# Patient Record
Sex: FEMALE | Race: WHITE
[De-identification: names, ages, dates, MRNs, and addresses within clinical notes are randomized per-mention and may not be internally consistent; named-entity substitution may affect disease eponyms.]

---

## 2019-06-28 ENCOUNTER — HOSPITAL ENCOUNTER (OUTPATIENT)
Dept: HOSPITAL 61 - KCIC MAMMO | Age: 44
Discharge: HOME | End: 2019-06-28
Attending: FAMILY MEDICINE
Payer: COMMERCIAL

## 2019-06-28 DIAGNOSIS — N63.11: ICD-10-CM

## 2019-06-28 DIAGNOSIS — Z12.31: Primary | ICD-10-CM

## 2019-06-28 PROCEDURE — 77067 SCR MAMMO BI INCL CAD: CPT

## 2019-06-28 NOTE — [facility]
Bilateral digital screening mammograms:

 

Reason for examination: Routine screening. New baseline.

 

Interpretation was made with the benefit of CAD.

 

The skin and nipples show no abnormalities. No abnormal axillary lymph 

nodes are seen. The breast parenchyma is heterogeneously dense. (Breast 

density: Category C.) There is a circumscribed lesion in the 9:30 position

9 cm posterior to the nipple in the right breast which appears to measure 

approximately 1.5 cm in greatest dimension. There is also a nodule present

within the dense parenchyma more anteriorly at the 9:00 position 4 cm from

the nipple measuring 1.2 cm in greatest dimension further evaluation with 

ultrasound is recommended. There are no other new dominant masses, 

suspicious calcifications or architectural distortion.

 

Impression:

 

Nodular densities at the 9:30 position 9 cm from the nipple and at the 

9:00 position 4 cm from the nipple in the right breast. These measure 

approximately 1.5 cm and 1.2 cm in greatest dimensions respectively. 

Recommend further evaluation with ultrasound.

 

Your patient's mammogram demonstrates that she has dense breast tissue 

(breast density category C or D), which could hide abnormalities, and if 

she has other risk factors for breast cancer that have been identified, 

she might benefit from supplemental screening tests that may be suggested 

by you as her ordering physician. Dense breast tissue, in and of itself, 

is a relatively common condition. Therefore, this information is not 

provided to cause undue concern, but rather to raise your awareness and to

promote discussion with your patient regarding the presence of other risk 

factors, in addition to dense breast tissue. Your patient's mammography 

results will be sent to her.

 

BI-RAD Category 0: Incomplete. Needs additional imaging evaluation.

 

"Our facility is accredited by the [employer] 

Mammography Program."

 

This patient's information has been entered into a reminder system for the

patient to be notified with the results of her examination and a target 

date for the next mammogram.

 

Electronically signed by: Lulu Baron MD (6/28/2019 12:02 PM) [facility]

## 2019-07-05 ENCOUNTER — HOSPITAL ENCOUNTER (OUTPATIENT)
Dept: HOSPITAL 61 - KCIC US | Age: 44
Discharge: HOME | End: 2019-07-05
Attending: FAMILY MEDICINE
Payer: COMMERCIAL

## 2019-07-05 DIAGNOSIS — N60.01: Primary | ICD-10-CM

## 2019-07-05 DIAGNOSIS — N60.41: ICD-10-CM

## 2019-07-05 PROCEDURE — 76641 ULTRASOUND BREAST COMPLETE: CPT

## 2019-07-05 NOTE — [facility]
Right breast ultrasound:

 

Reason for examination: Nodular densities on screening mammogram.

 

Comparison is made to mammographic exam dated 6/28/2019.

 

Ultrasound examination was performed with attention to the lateral right 

breast and right axilla.

 

In the 9:30 position 7 cm from the nipple, there is a 1.5 cm simple cyst 

this corresponds to the density seen posteriorly. More anteriorly at the 

9:00 position 1 cm from the nipple, there is focal ductal ectasia 

measuring 1.5 cm in length and 3 mm in width. No other cystic or solid 

nodules are seen. No abnormal appearing lymph nodes are seen in the 

axilla.

 

IMPRESSION:

 

Simple cyst at the 9:30 position 7 cm from the nipple and ductal ectasia 

at the 9:00 position 1 cm from the nipple. No other suspicious abnormality

seen. Recommend 6 month follow-up with right breast mammograms and 

ultrasound.

 

BI-RADS Category 3:  Probably Benign.

 

"Our facility is accredited by the [employer] 

Mammography Program."

 

This patient's information has been entered into a reminder system for the

patient to be notified with the results of her examination and a target 

date for the next mammogram.

 

Electronically signed by: Lulu Baron MD (7/5/2019 10:14 AM) [facility]

## 2019-12-26 ENCOUNTER — HOSPITAL ENCOUNTER (OUTPATIENT)
Dept: HOSPITAL 61 - KCIC MAMMO | Age: 44
Discharge: HOME | End: 2019-12-26
Attending: FAMILY MEDICINE
Payer: COMMERCIAL

## 2019-12-26 DIAGNOSIS — N60.41: ICD-10-CM

## 2019-12-26 DIAGNOSIS — N60.01: Primary | ICD-10-CM

## 2019-12-26 PROCEDURE — 77065 DX MAMMO INCL CAD UNI: CPT

## 2019-12-26 PROCEDURE — 76641 ULTRASOUND BREAST COMPLETE: CPT

## 2019-12-26 NOTE — [facility]
RIGHT DIAGNOSTIC MAMMOGRAPHY AND BREAST ULTRASOUND 

 

History: Six-month follow-up

 

Comparison:  Bilateral mammogram June 28, 2019.

 

Technique: Right digital mammogram views were obtained. 

 

Findings:  

Breast Tissue Density C : The breasts are heterogeneously dense, which may

obscure small masses.

Well-circumscribed mass in the right breast 9:30 C position is stable.

There are no suspicious microcalcifications or architectural distortion.

 

Real-time ultrasound imaging of the right breast is performed.

Stable benign cyst at the 9:30 o'clock position 7 cm from the nipple 

measuring up to 1.4 cm. Ductal ectasia at the 9:00 position 1 cm from the 

nipple has decreased from prior study. There is no new finding.

 

IMPRESSION:

Stable right mammogram. Ultrasound findings in the right breast are 

benign. Recommend patient return to routine mammogram screening.

 

BI-RADS category 2:  Benign findings.

 

The images were reviewed with computer-aided detection.

 

Patient information is entered into the reminder system with a target due 

date for the next screening mammogram.

 

Mammography is the most sensitive method for finding small breast cancers,

but it does not detect them all and is not a substitute for careful 

clinical examination. A negative mammogram does not negate a clinically 

suspicious finding and should not result in delay in biopsying a 

clinically suspicious abnormality.

 

 "Our facility is accredited by the [employer] 

Mammography Program."

 

Electronically signed by: Gallito Almanza MD (12/26/2019 10:34 AM) [facility]

## 2019-12-26 NOTE — [facility]
RIGHT DIAGNOSTIC MAMMOGRAPHY AND BREAST ULTRASOUND 

 

History: Six-month follow-up

 

Comparison:  Bilateral mammogram June 28, 2019.

 

Technique: Right digital mammogram views were obtained. 

 

Findings:  

Breast Tissue Density C : The breasts are heterogeneously dense, which may

obscure small masses.

Well-circumscribed mass in the right breast 9:30 C position is stable.

There are no suspicious microcalcifications or architectural distortion.

 

Real-time ultrasound imaging of the right breast is performed.

Stable benign cyst at the 9:30 o'clock position 7 cm from the nipple 

measuring up to 1.4 cm. Ductal ectasia at the 9:00 position 1 cm from the 

nipple has decreased from prior study. There is no new finding.

 

IMPRESSION:

Stable right mammogram. Ultrasound findings in the right breast are 

benign. Recommend patient return to routine mammogram screening.

 

BI-RADS category 2:  Benign findings.

 

The images were reviewed with computer-aided detection.

 

Patient information is entered into the reminder system with a target due 

date for the next screening mammogram.

 

Mammography is the most sensitive method for finding small breast cancers,

but it does not detect them all and is not a substitute for careful 

clinical examination. A negative mammogram does not negate a clinically 

suspicious finding and should not result in delay in biopsying a 

clinically suspicious abnormality.

 

 "Our facility is accredited by the [employer] 

Mammography Program."

 

Electronically signed by: Gallito Almanza MD (12/26/2019 10:34 AM) [facility]

## 2022-05-27 ENCOUNTER — HOSPITAL ENCOUNTER (OUTPATIENT)
Dept: HOSPITAL 61 - KCIC MAMMO | Age: 47
End: 2022-05-27
Attending: FAMILY MEDICINE
Payer: COMMERCIAL

## 2022-05-27 DIAGNOSIS — Z12.31: Primary | ICD-10-CM

## 2022-05-27 PROCEDURE — 77063 BREAST TOMOSYNTHESIS BI: CPT

## 2022-05-27 PROCEDURE — 77067 SCR MAMMO BI INCL CAD: CPT

## 2022-05-27 NOTE — [facility]
Bilateral digital screening mammograms with 3-D tomosynthesis:



Reason for examination: Routine screening.



Comparison is made to previous study dated 6/28/2019 and 12/26/2019.



Bilateral mammograms in CC and oblique projections were obtained with 2-D imaging and 3-D tomosynthes
is imaging on a Siemens Inspiration unit and reviewed on the workstation. Interpretation was made tomas hidalgo the benefit of CAD.



The skin and nipples show no abnormalities. No abnormal axillary lymph nodes are seen. The breast par
enchyma is heterogeneously dense. (Breast density: Category C.) There continues to be a circumscribed
 nodule at the 9:30 position of the right breast which is stable. There are no new dominant masses, s
uspicious calcifications or architectural distortion.



Impression:



No evidence of malignancy. Recommend routine screening.



Your patient's mammogram demonstrates that she has dense breast tissue (breast density category C or 
D), which could hide abnormalities, and if she has other risk factors for breast cancer that have bee
n identified, she might benefit from supplemental screening tests that may be suggested by you as her
 ordering physician. Dense breast tissue, in and of itself, is a relatively common condition. Therefo
re, this information is not provided to cause undue concern, but rather to raise your awareness and t
o promote discussion with your patient regarding the presence of other risk factors, in addition to d
ense breast tissue. Your patient's mammography results will be sent to her.



BI-RAD Category 2: Benign.



"Our facility is accredited by the [employer] Mammography Program."



This patient's information has been entered into a reminder system for the patient to be notified wit
h the results of her examination and a target date for the next mammogram.



Electronically signed by: Lulu Baron MD (5/27/2022 9:45 AM) [email or web address]